# Patient Record
(demographics unavailable — no encounter records)

---

## 2025-02-13 NOTE — HISTORY OF PRESENT ILLNESS
[TextBox_4] : 54 year old male with CHRISTINE, bronchiectasis post nasal drip came for f/u Last visit he was started on Trelegy He received his new PAP. Compliance report: usage 137/199 days Average use 5.6 Leak 14 AHi 0.7 Uses PAP every night. Every month he uses travel PAP. Sleeps well with PAP. No mask problems Has post nasal drip but not every day. Does not interfere with PAP use. No dyspnea. No hospitalizations He is not using any inhalers now
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
## 2025-03-12 NOTE — ASSESSMENT
[FreeTextEntry1] : - We had a discussion today about prostate cancer screening sharing information and reaching consencus based on patient preference, and we agreed on the plan to procedure with screening in light of the AUA recommendations for early detection of PCA published 2023.  Age of starting screening : -Average risk:  start at 45-50  and continue every 2-4 years till age 69 years. Elevated risk (Black ancestry, germline mutations, strong FH of PCa, or breast/ovarian, or by risk calculator): start at 40-45 years and if high confirmatory PSA (with % freePSA)and JOSE.  If found to have elevated PSA and elevated risk shared decision making should be done to proceed according to the AUA guidlines with monitoring or adjunctive markers, or prostate MRI or proceed to prostate biopsy.  - LUTS will try Cialis 5mg qd for LUTS and ED  PSA F/T and prostate MRI and RTC 3 weeks

## 2025-04-15 NOTE — ASSESSMENT
[FreeTextEntry1] :  I discussed with the patient today the indications for the biopsy according to the AUA recommendations. I described the procedure preparation including the bowel prep the day before, and perioperative antibiotic use according to our protocol of single morning dose of Ancef adjusted to his weight after checking the urine is clear from bacteruia a week before. Using guidance of ultrasound imaging the biopsies are taken through the transperineal area using the MUSIC template and any targeted samples. Patient understands that he will go home same day of procedure after he voids in recovery, and will have a haji placed if he cannot void or has a large postvoid residual urine estimated by US. The patient will have the haji removed  in 2 days in case he needs one. We will schedule a follow up in 2 weeks after the biopsy to discuss the results. It is expected to have mild hematuria for up to 2 weeks, and hematospermia for up to 6 weeks. The patient understands the risk of fever/sepsis is <5% and that requires the patient to be admitted for IV antibiotics. The patient understands he should stop anticoagulants before biopsy according to protocol, and resume day after the procedure. All questions were answered to his satisfaction, and he agreed to proceed.
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
## 2025-05-13 NOTE — ASSESSMENT
[FreeTextEntry1] : 1- Elevated PSA TP biopsy 5/2/2025 BPH 45 gm prostate .   Discussed the possibility of a missed PCa, however this is low. We reached a shared decision to monitor and repeat PSA testing will follow PSA in 1 year to identify if there is need for repeat systematic biopsy. 2- OAB doing well on myrbetrique 25 mg RTC 3 months
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
